# Patient Record
Sex: MALE | Race: WHITE | NOT HISPANIC OR LATINO | Employment: FULL TIME | URBAN - METROPOLITAN AREA
[De-identification: names, ages, dates, MRNs, and addresses within clinical notes are randomized per-mention and may not be internally consistent; named-entity substitution may affect disease eponyms.]

---

## 2017-03-13 PROBLEM — F32.9 MAJOR DEPRESSIVE DISORDER: Chronic | Status: ACTIVE | Noted: 2017-03-13

## 2017-03-13 PROBLEM — E78.00 HYPERCHOLESTEREMIA: Chronic | Status: ACTIVE | Noted: 2017-03-13

## 2017-03-13 PROBLEM — E11.9 TYPE 2 DIABETES MELLITUS: Chronic | Status: ACTIVE | Noted: 2017-03-13

## 2017-03-13 PROBLEM — F51.01 PRIMARY INSOMNIA: Chronic | Status: RESOLVED | Noted: 2017-03-13 | Resolved: 2017-03-13

## 2017-03-13 PROBLEM — T78.40XA ALLERGY: Chronic | Status: ACTIVE | Noted: 2017-03-13

## 2017-03-13 PROBLEM — I25.810 CORONARY ARTERY DISEASE INVOLVING AUTOLOGOUS ARTERY CORONARY BYPASS GRAFT WITHOUT ANGINA PECTORIS: Chronic | Status: ACTIVE | Noted: 2017-03-13

## 2017-03-13 PROBLEM — F51.01 PRIMARY INSOMNIA: Chronic | Status: ACTIVE | Noted: 2017-03-13

## 2017-09-21 PROBLEM — I10 ESSENTIAL HYPERTENSION: Chronic | Status: ACTIVE | Noted: 2017-09-21

## 2019-03-27 PROBLEM — E78.00 HYPERCHOLESTEREMIA: Chronic | Status: RESOLVED | Noted: 2017-03-13 | Resolved: 2019-03-27

## 2019-09-13 PROBLEM — E55.9 VITAMIN D DEFICIENCY: Chronic | Status: ACTIVE | Noted: 2019-09-13

## 2020-09-04 ENCOUNTER — LAB VISIT (OUTPATIENT)
Dept: SURGERY | Facility: CLINIC | Age: 50
End: 2020-09-04
Payer: COMMERCIAL

## 2020-09-04 DIAGNOSIS — R05.9 COUGH: ICD-10-CM

## 2020-09-04 PROCEDURE — U0003 INFECTIOUS AGENT DETECTION BY NUCLEIC ACID (DNA OR RNA); SEVERE ACUTE RESPIRATORY SYNDROME CORONAVIRUS 2 (SARS-COV-2) (CORONAVIRUS DISEASE [COVID-19]), AMPLIFIED PROBE TECHNIQUE, MAKING USE OF HIGH THROUGHPUT TECHNOLOGIES AS DESCRIBED BY CMS-2020-01-R: HCPCS

## 2020-09-05 LAB — SARS-COV-2 RNA RESP QL NAA+PROBE: NOT DETECTED

## 2020-09-08 ENCOUNTER — TELEPHONE (OUTPATIENT)
Dept: SURGERY | Facility: CLINIC | Age: 50
End: 2020-09-08

## 2020-09-08 NOTE — PROGRESS NOTES
Your test was NEGATIVE for COVID-19 (coronavirus).      If your symptoms worsen or if you have any other concerns, please contact [facility] at [phone].     Sincerely,    Alanis Leija NP

## 2020-09-08 NOTE — TELEPHONE ENCOUNTER
----- Message from Alanis Leija NP sent at 9/8/2020  8:26 AM CDT -----  Your test was NEGATIVE for COVID-19 (coronavirus).      If your symptoms worsen or if you have any other concerns, please contact [facility] at [phone].     Sincerely,    Alanis Leija NP

## 2020-09-08 NOTE — TELEPHONE ENCOUNTER
Called patient to inform of Covid 19 screening test results as reported by the laboratory, and review by Alanis Leija NP. Patient informed Covid \-19 screening test results are reported as Covid Not Detected. Patient was given the opportunity to ask any questions about the Covid test results.

## 2020-09-08 NOTE — TELEPHONE ENCOUNTER
Called patient to inform of Covid 19 screening test results as reported by the laboratory, and review by Alanis Leija NP. Patient informed Covid -19 screening test results are reported as Covid Not Detected. Patient was given the opportunity to ask any questions about the Covid test results.

## 2020-10-12 ENCOUNTER — PATIENT MESSAGE (OUTPATIENT)
Dept: ADMINISTRATIVE | Facility: OTHER | Age: 50
End: 2020-10-12

## 2020-11-19 LAB
LEFT EYE DM RETINOPATHY: NEGATIVE
LEFT EYE DM RETINOPATHY: NEGATIVE
RIGHT EYE DM RETINOPATHY: NEGATIVE
RIGHT EYE DM RETINOPATHY: NEGATIVE

## 2021-04-26 ENCOUNTER — PATIENT MESSAGE (OUTPATIENT)
Dept: RESEARCH | Facility: HOSPITAL | Age: 51
End: 2021-04-26

## 2021-09-22 ENCOUNTER — OFFICE VISIT (OUTPATIENT)
Dept: PSYCHIATRY | Facility: CLINIC | Age: 51
End: 2021-09-22
Payer: COMMERCIAL

## 2021-09-22 VITALS
WEIGHT: 164.69 LBS | SYSTOLIC BLOOD PRESSURE: 151 MMHG | BODY MASS INDEX: 25.85 KG/M2 | HEART RATE: 70 BPM | HEIGHT: 67 IN | DIASTOLIC BLOOD PRESSURE: 80 MMHG

## 2021-09-22 DIAGNOSIS — G47.00 INSOMNIA, UNSPECIFIED TYPE: ICD-10-CM

## 2021-09-22 DIAGNOSIS — F41.0 PANIC ATTACKS: ICD-10-CM

## 2021-09-22 DIAGNOSIS — F33.1 MDD (MAJOR DEPRESSIVE DISORDER), RECURRENT EPISODE, MODERATE: ICD-10-CM

## 2021-09-22 DIAGNOSIS — F41.1 GAD (GENERALIZED ANXIETY DISORDER): Primary | ICD-10-CM

## 2021-09-22 PROCEDURE — 99999 PR PBB SHADOW E&M-EST. PATIENT-LVL II: ICD-10-PCS | Mod: PBBFAC,,, | Performed by: STUDENT IN AN ORGANIZED HEALTH CARE EDUCATION/TRAINING PROGRAM

## 2021-09-22 PROCEDURE — 99499 NO LOS: ICD-10-PCS | Mod: S$GLB,,, | Performed by: STUDENT IN AN ORGANIZED HEALTH CARE EDUCATION/TRAINING PROGRAM

## 2021-09-22 PROCEDURE — 99499 UNLISTED E&M SERVICE: CPT | Mod: S$GLB,,, | Performed by: STUDENT IN AN ORGANIZED HEALTH CARE EDUCATION/TRAINING PROGRAM

## 2021-09-22 PROCEDURE — 99999 PR PBB SHADOW E&M-EST. PATIENT-LVL II: CPT | Mod: PBBFAC,,, | Performed by: STUDENT IN AN ORGANIZED HEALTH CARE EDUCATION/TRAINING PROGRAM

## 2021-09-22 RX ORDER — MIRTAZAPINE 15 MG/1
15 TABLET, FILM COATED ORAL NIGHTLY
Qty: 30 TABLET | Refills: 2 | Status: SHIPPED | OUTPATIENT
Start: 2021-09-22 | End: 2021-12-16

## 2021-09-22 RX ORDER — HYDROXYZINE HYDROCHLORIDE 10 MG/1
10 TABLET, FILM COATED ORAL 3 TIMES DAILY PRN
Qty: 90 TABLET | Refills: 2 | Status: SHIPPED | OUTPATIENT
Start: 2021-09-22 | End: 2021-10-22

## 2021-09-22 RX ORDER — SERTRALINE HYDROCHLORIDE 100 MG/1
200 TABLET, FILM COATED ORAL DAILY
Qty: 60 TABLET | Refills: 2 | Status: SHIPPED | OUTPATIENT
Start: 2021-09-22 | End: 2021-10-20 | Stop reason: SDUPTHER

## 2021-10-05 ENCOUNTER — TELEPHONE (OUTPATIENT)
Dept: PSYCHOLOGY | Facility: CLINIC | Age: 51
End: 2021-10-05

## 2021-10-20 ENCOUNTER — OFFICE VISIT (OUTPATIENT)
Dept: PSYCHIATRY | Facility: CLINIC | Age: 51
End: 2021-10-20
Payer: COMMERCIAL

## 2021-10-20 VITALS
HEART RATE: 67 BPM | SYSTOLIC BLOOD PRESSURE: 147 MMHG | DIASTOLIC BLOOD PRESSURE: 73 MMHG | BODY MASS INDEX: 25.38 KG/M2 | WEIGHT: 162.06 LBS

## 2021-10-20 DIAGNOSIS — F41.1 GAD (GENERALIZED ANXIETY DISORDER): ICD-10-CM

## 2021-10-20 DIAGNOSIS — F41.0 PANIC ATTACKS: ICD-10-CM

## 2021-10-20 DIAGNOSIS — F33.1 MDD (MAJOR DEPRESSIVE DISORDER), RECURRENT EPISODE, MODERATE: ICD-10-CM

## 2021-10-20 PROCEDURE — 99214 OFFICE O/P EST MOD 30 MIN: CPT | Mod: S$GLB,,, | Performed by: STUDENT IN AN ORGANIZED HEALTH CARE EDUCATION/TRAINING PROGRAM

## 2021-10-20 PROCEDURE — 99999 PR PBB SHADOW E&M-EST. PATIENT-LVL I: CPT | Mod: PBBFAC,,, | Performed by: STUDENT IN AN ORGANIZED HEALTH CARE EDUCATION/TRAINING PROGRAM

## 2021-10-20 PROCEDURE — 99214 PR OFFICE/OUTPT VISIT, EST, LEVL IV, 30-39 MIN: ICD-10-PCS | Mod: S$GLB,,, | Performed by: STUDENT IN AN ORGANIZED HEALTH CARE EDUCATION/TRAINING PROGRAM

## 2021-10-20 PROCEDURE — 99999 PR PBB SHADOW E&M-EST. PATIENT-LVL I: ICD-10-PCS | Mod: PBBFAC,,, | Performed by: STUDENT IN AN ORGANIZED HEALTH CARE EDUCATION/TRAINING PROGRAM

## 2021-10-20 RX ORDER — CLONAZEPAM 0.5 MG/1
0.5 TABLET ORAL DAILY PRN
Qty: 30 TABLET | Refills: 0 | Status: SHIPPED | OUTPATIENT
Start: 2021-10-20 | End: 2021-11-22 | Stop reason: SDUPTHER

## 2021-10-20 RX ORDER — MIRTAZAPINE 30 MG/1
30 TABLET, FILM COATED ORAL NIGHTLY
Qty: 30 TABLET | Refills: 0 | Status: SHIPPED | OUTPATIENT
Start: 2021-10-20 | End: 2021-12-01 | Stop reason: SDUPTHER

## 2021-10-20 RX ORDER — SERTRALINE HYDROCHLORIDE 100 MG/1
200 TABLET, FILM COATED ORAL DAILY
Qty: 60 TABLET | Refills: 2 | Status: SHIPPED | OUTPATIENT
Start: 2021-10-20 | End: 2021-11-30 | Stop reason: SDUPTHER

## 2021-10-25 ENCOUNTER — OFFICE VISIT (OUTPATIENT)
Dept: ORTHOPEDICS | Facility: CLINIC | Age: 51
End: 2021-10-25
Payer: COMMERCIAL

## 2021-10-25 VITALS
BODY MASS INDEX: 25.99 KG/M2 | HEART RATE: 74 BPM | WEIGHT: 165.56 LBS | HEIGHT: 67 IN | SYSTOLIC BLOOD PRESSURE: 141 MMHG | DIASTOLIC BLOOD PRESSURE: 81 MMHG | RESPIRATION RATE: 16 BRPM

## 2021-10-25 DIAGNOSIS — M25.562 ACUTE PAIN OF LEFT KNEE: Primary | ICD-10-CM

## 2021-10-25 PROCEDURE — 1160F PR REVIEW ALL MEDS BY PRESCRIBER/CLIN PHARMACIST DOCUMENTED: ICD-10-PCS | Mod: CPTII,S$GLB,, | Performed by: ORTHOPAEDIC SURGERY

## 2021-10-25 PROCEDURE — 3077F PR MOST RECENT SYSTOLIC BLOOD PRESSURE >= 140 MM HG: ICD-10-PCS | Mod: CPTII,S$GLB,, | Performed by: ORTHOPAEDIC SURGERY

## 2021-10-25 PROCEDURE — 3008F BODY MASS INDEX DOCD: CPT | Mod: CPTII,S$GLB,, | Performed by: ORTHOPAEDIC SURGERY

## 2021-10-25 PROCEDURE — 3077F SYST BP >= 140 MM HG: CPT | Mod: CPTII,S$GLB,, | Performed by: ORTHOPAEDIC SURGERY

## 2021-10-25 PROCEDURE — 1159F MED LIST DOCD IN RCRD: CPT | Mod: CPTII,S$GLB,, | Performed by: ORTHOPAEDIC SURGERY

## 2021-10-25 PROCEDURE — 3079F DIAST BP 80-89 MM HG: CPT | Mod: CPTII,S$GLB,, | Performed by: ORTHOPAEDIC SURGERY

## 2021-10-25 PROCEDURE — 3044F PR MOST RECENT HEMOGLOBIN A1C LEVEL <7.0%: ICD-10-PCS | Mod: CPTII,S$GLB,, | Performed by: ORTHOPAEDIC SURGERY

## 2021-10-25 PROCEDURE — 99999 PR PBB SHADOW E&M-EST. PATIENT-LVL V: CPT | Mod: PBBFAC,,, | Performed by: ORTHOPAEDIC SURGERY

## 2021-10-25 PROCEDURE — 99999 PR PBB SHADOW E&M-EST. PATIENT-LVL V: ICD-10-PCS | Mod: PBBFAC,,, | Performed by: ORTHOPAEDIC SURGERY

## 2021-10-25 PROCEDURE — 3044F HG A1C LEVEL LT 7.0%: CPT | Mod: CPTII,S$GLB,, | Performed by: ORTHOPAEDIC SURGERY

## 2021-10-25 PROCEDURE — 99204 PR OFFICE/OUTPT VISIT, NEW, LEVL IV, 45-59 MIN: ICD-10-PCS | Mod: S$GLB,,, | Performed by: ORTHOPAEDIC SURGERY

## 2021-10-25 PROCEDURE — 1160F RVW MEDS BY RX/DR IN RCRD: CPT | Mod: CPTII,S$GLB,, | Performed by: ORTHOPAEDIC SURGERY

## 2021-10-25 PROCEDURE — 3008F PR BODY MASS INDEX (BMI) DOCUMENTED: ICD-10-PCS | Mod: CPTII,S$GLB,, | Performed by: ORTHOPAEDIC SURGERY

## 2021-10-25 PROCEDURE — 3079F PR MOST RECENT DIASTOLIC BLOOD PRESSURE 80-89 MM HG: ICD-10-PCS | Mod: CPTII,S$GLB,, | Performed by: ORTHOPAEDIC SURGERY

## 2021-10-25 PROCEDURE — 99204 OFFICE O/P NEW MOD 45 MIN: CPT | Mod: S$GLB,,, | Performed by: ORTHOPAEDIC SURGERY

## 2021-10-25 PROCEDURE — 1159F PR MEDICATION LIST DOCUMENTED IN MEDICAL RECORD: ICD-10-PCS | Mod: CPTII,S$GLB,, | Performed by: ORTHOPAEDIC SURGERY

## 2021-11-02 ENCOUNTER — TELEPHONE (OUTPATIENT)
Dept: PSYCHOLOGY | Facility: CLINIC | Age: 51
End: 2021-11-02
Payer: COMMERCIAL

## 2021-12-01 ENCOUNTER — OFFICE VISIT (OUTPATIENT)
Dept: PSYCHIATRY | Facility: CLINIC | Age: 51
End: 2021-12-01
Payer: COMMERCIAL

## 2021-12-01 VITALS
DIASTOLIC BLOOD PRESSURE: 72 MMHG | SYSTOLIC BLOOD PRESSURE: 138 MMHG | BODY MASS INDEX: 25.34 KG/M2 | HEART RATE: 78 BPM | WEIGHT: 161.81 LBS

## 2021-12-01 DIAGNOSIS — F41.1 GAD (GENERALIZED ANXIETY DISORDER): Primary | ICD-10-CM

## 2021-12-01 DIAGNOSIS — F33.1 MDD (MAJOR DEPRESSIVE DISORDER), RECURRENT EPISODE, MODERATE: ICD-10-CM

## 2021-12-01 DIAGNOSIS — F41.0 PANIC ATTACKS: ICD-10-CM

## 2021-12-01 PROCEDURE — 99999 PR PBB SHADOW E&M-EST. PATIENT-LVL I: CPT | Mod: PBBFAC,,, | Performed by: STUDENT IN AN ORGANIZED HEALTH CARE EDUCATION/TRAINING PROGRAM

## 2021-12-01 PROCEDURE — 99214 PR OFFICE/OUTPT VISIT, EST, LEVL IV, 30-39 MIN: ICD-10-PCS | Mod: S$GLB,,, | Performed by: STUDENT IN AN ORGANIZED HEALTH CARE EDUCATION/TRAINING PROGRAM

## 2021-12-01 PROCEDURE — 99999 PR PBB SHADOW E&M-EST. PATIENT-LVL I: ICD-10-PCS | Mod: PBBFAC,,, | Performed by: STUDENT IN AN ORGANIZED HEALTH CARE EDUCATION/TRAINING PROGRAM

## 2021-12-01 PROCEDURE — 99214 OFFICE O/P EST MOD 30 MIN: CPT | Mod: S$GLB,,, | Performed by: STUDENT IN AN ORGANIZED HEALTH CARE EDUCATION/TRAINING PROGRAM

## 2021-12-01 RX ORDER — CLONAZEPAM 0.5 MG/1
0.5 TABLET ORAL 2 TIMES DAILY
Qty: 30 TABLET | Refills: 0 | Status: SHIPPED | OUTPATIENT
Start: 2021-12-01 | End: 2021-12-15 | Stop reason: SDUPTHER

## 2021-12-01 RX ORDER — SERTRALINE HYDROCHLORIDE 100 MG/1
200 TABLET, FILM COATED ORAL DAILY
Qty: 60 TABLET | Refills: 3 | Status: SHIPPED | OUTPATIENT
Start: 2021-12-01 | End: 2022-01-05 | Stop reason: SDUPTHER

## 2021-12-01 RX ORDER — MIRTAZAPINE 30 MG/1
30 TABLET, FILM COATED ORAL NIGHTLY
Qty: 30 TABLET | Refills: 3 | Status: SHIPPED | OUTPATIENT
Start: 2021-12-01 | End: 2021-12-16

## 2021-12-01 RX ORDER — BUSPIRONE HYDROCHLORIDE 30 MG/1
30 TABLET ORAL 2 TIMES DAILY
Qty: 60 TABLET | Refills: 3 | Status: SHIPPED | OUTPATIENT
Start: 2021-12-01 | End: 2021-12-16 | Stop reason: SDUPTHER

## 2021-12-01 RX ORDER — HYDROXYZINE HYDROCHLORIDE 25 MG/1
25 TABLET, FILM COATED ORAL 2 TIMES DAILY PRN
Qty: 60 TABLET | Refills: 3 | Status: SHIPPED | OUTPATIENT
Start: 2021-12-01 | End: 2022-03-11

## 2022-01-05 ENCOUNTER — OFFICE VISIT (OUTPATIENT)
Dept: PSYCHIATRY | Facility: CLINIC | Age: 52
End: 2022-01-05
Payer: COMMERCIAL

## 2022-01-05 DIAGNOSIS — F41.1 GAD (GENERALIZED ANXIETY DISORDER): ICD-10-CM

## 2022-01-05 DIAGNOSIS — F41.0 PANIC ATTACKS: ICD-10-CM

## 2022-01-05 DIAGNOSIS — F33.1 MDD (MAJOR DEPRESSIVE DISORDER), RECURRENT EPISODE, MODERATE: ICD-10-CM

## 2022-01-05 DIAGNOSIS — G47.00 INSOMNIA, UNSPECIFIED TYPE: Primary | ICD-10-CM

## 2022-01-05 PROCEDURE — 99213 PR OFFICE/OUTPT VISIT, EST, LEVL III, 20-29 MIN: ICD-10-PCS | Mod: 95,,, | Performed by: STUDENT IN AN ORGANIZED HEALTH CARE EDUCATION/TRAINING PROGRAM

## 2022-01-05 PROCEDURE — 99213 OFFICE O/P EST LOW 20 MIN: CPT | Mod: 95,,, | Performed by: STUDENT IN AN ORGANIZED HEALTH CARE EDUCATION/TRAINING PROGRAM

## 2022-01-05 RX ORDER — BUSPIRONE HYDROCHLORIDE 30 MG/1
30 TABLET ORAL 2 TIMES DAILY
Qty: 60 TABLET | Refills: 2 | Status: SHIPPED | OUTPATIENT
Start: 2022-01-05 | End: 2022-02-02 | Stop reason: SDUPTHER

## 2022-01-05 RX ORDER — CLONAZEPAM 0.5 MG/1
0.5 TABLET ORAL 2 TIMES DAILY PRN
Qty: 60 TABLET | Refills: 0 | Status: SHIPPED | OUTPATIENT
Start: 2022-01-05 | End: 2022-02-02 | Stop reason: SDUPTHER

## 2022-01-05 RX ORDER — SERTRALINE HYDROCHLORIDE 100 MG/1
200 TABLET, FILM COATED ORAL DAILY
Qty: 60 TABLET | Refills: 3 | Status: SHIPPED | OUTPATIENT
Start: 2022-01-05 | End: 2022-02-02 | Stop reason: SDUPTHER

## 2022-01-05 RX ORDER — MIRTAZAPINE 30 MG/1
30 TABLET, FILM COATED ORAL NIGHTLY
Qty: 30 TABLET | Refills: 11 | Status: SHIPPED | OUTPATIENT
Start: 2022-01-05 | End: 2022-02-02 | Stop reason: SDUPTHER

## 2022-01-05 NOTE — PROGRESS NOTES
OUTPATIENT PSYCHIATRY FOLLOW UP VISIT     ENCOUNTER DATE:  1/5/22  SITE:  [facility], [facility]  LENGTH OF SESSION:  15 minutes    TELE PSYCHIATRY Disclaimer   *The patient was informed despite using HIPPA compliant technology there may be risks including security breach, technological failure, inability to perform a comprehensive physical exam which could delay or prevent an accurate diagnosis, and potential complications from treatment decisions rendered over a telemedical platform. The patient understands and consented to the use of tele-health service as being a safe measure to mitigate during COVID 19 Pandemic .  The patient was also informed of the relationship between the physician and patient and the respective role of any other health care provider with respect to management of the patient; and notified that the pt may decline to receive medical services by telemedicine and may withdraw from such care at any time.     Patient's Current location:     [address] [place] [address]  Visit type: Virtual visit with synchronous audio and video via [employer]  Total time spent with patient: 15 minutes         CHIEF COMPLAINT:  Anxiety, depression     HISTORY OF PRESENTING ILLNESS:  Joel Zheng is a 51 y.o. male with history of anxiety, depression who presents for follow up appointment.       Plan at last appointment on  12/1/2021:  · Continue Zoloft 200mg qd.       · Continue Remeron 30mg qhs for insomnia, anxiety.    · Continue Atarax 10mg TID prn   · Increase Klonppin to 0.5mg BID.   Discussed risks including addictive potential.  30 day supply provided. Discussed temporary measure to help bridge him though this difficult time.   · Start Buspar 30mg BID for depression, anxiety augmentation.  Currently tolerating 15mg BID without s/e.     · Pt educated on risks including alcohol, addictive potential, not to operate vehicles or heavy machinery.      · Continue with outside  counselor/therapist whom he is seeing twice weekly with notable benefit.        Interval history as told by Patient - & or family/friend/spouse/caregiver with pts permission     In better spirits today. Holidays were rough given first time without wife.  Divorce ongoing but not final.  He spent time with family and brothers which helped.  No additional setbacks or stressors. Feels like medications have made a big difference.  Compliant with zoloft, remeron, buspar.  No se.  Taking 1 klonopin in the morning, 1/2-1 tablet after lunch.  Feels like it has been helpful preventing anxiety/panic attacks.    No si/hi/avh.  Pt appears objectively better today.  Euthymic, not perseverating on divorce.  Pt reports that he has only been taking half tablets of buspar twice a day (15mg BID).  Instructed to get to therapeutic dose of 30mg BID and will evaluate at next appt if this medication is helpful.               PSYCHIATRIC REVIEW OF SYSTEMS:(none/ yes- better/worse/stable/& what symptoms)     Symptoms of Depression: Improving.   diminished mood, anhedonia, diminished energy, insomnia, decreased appetite, diminished concentration, PMR, hopelessness, Onset 2 months ago.        Symptoms of PUJA:  Improving.  anxiety/worry/fear, more days than not, about numerous issues, difficult to control, with restlessness, fatigue, poor concentration, irritability, muscle tension, sleep disturbance; causes functionally impairing distress   Onset was approximately 2 month ago. Symptoms have been gradually worsening since that time.     Symptoms of Panic Attacks: Improving.  recurrent panic attacks, improving in frequency.  Triggered by contact with  wife.  Description- as above     Symptoms of jose carlos or hypomania: none     Symptoms of psychosis:  none     Symptoms of PTSD:none     Sleep: initiation,  maintenance     Other Psych ROS: n/a     Symptoms of OCD: none     Symptoms of Eating Disorders: none     Symptoms of ADHD:   none     Risk Parameters:  Patient reports no suicidal ideation  Patient reports no homicidal ideation  Patient reports no self-injurious behavior  Patient reports no violent behavior     PSYCHIATRIC MED REVIEW     Current psych meds  Medication side effects:  denies  Medication compliance:   yes     Previous psych meds trials  Zoloft  buspar (makes him sleepy, Didn't work)  Paxil  remeron  Hydroxyzine  klonopin     PAST PSYCHIATRIC, MEDICAL, AND SOCIAL HISTORY REVIEWED  The patient's past medical, family and social history have been reviewed and updated as appropriate within the electronic medical record - see encounter notes.     MEDICAL REVIEW OF SYSTEMS:  Complete review of systems performed covering Constitutional, Musculoskeletal, Neurologic.  All systems negative/ except as mentioned in HPI.       ALL MEDICATIONS:     Current Outpatient Medications:     amoxicillin-clavulanate 500-125mg (AUGMENTIN) 500-125 mg Tab, Take 1 tablet (500 mg total) by mouth 2 (two) times daily., Disp: 10 tablet, Rfl: 0    aspirin 81 MG Chew, Take 81 mg by mouth once daily., Disp: , Rfl:     atorvastatin (LIPITOR) 80 MG tablet, Take 0.5 tablets (40 mg total) by mouth once daily., Disp: 90 tablet, Rfl: 0    blood sugar diagnostic Strp, 1 each by Misc.(Non-Drug; Combo Route) route 3 (three) times daily before meals., Disp: 100 each, Rfl: 2    cetirizine (ZYRTEC) 10 MG tablet, Take 1 tablet (10 mg total) by mouth once daily., Disp: 60 tablet, Rfl: 0    clonazePAM (KLONOPIN) 0.5 MG tablet, Take 1 tablet (0.5 mg total) by mouth daily as needed for Anxiety (panic attacks)., Disp: 30 tablet, Rfl: 0    ergocalciferol (VITAMIN D2) 50,000 unit Cap, Take 1 capsule (50,000 Units total) by mouth every 7 days., Disp: 12 capsule, Rfl: 0    fenofibrate (TRICOR) 54 MG tablet, Take 1 tablet (54 mg total) by mouth once daily., Disp: 90 tablet, Rfl: 0    fluticasone propionate (FLONASE) 50 mcg/actuation nasal spray, 1 spray (50 mcg total) by  Each Nostril route once daily., Disp: 16 g, Rfl: 0    HYDROcodone-acetaminophen (NORCO) 5-325 mg per tablet, Take 1 tablet by mouth every 4 (four) hours as needed for Pain., Disp: 10 tablet, Rfl: 0    metFORMIN (GLUCOPHAGE) 500 MG tablet, Take 1 tablet (500 mg total) by mouth 2 (two) times daily with meals., Disp: 180 tablet, Rfl: 0    metoprolol succinate (TOPROL-XL) 50 MG 24 hr tablet, Take 1 tablet (50 mg total) by mouth once daily., Disp: 90 tablet, Rfl: 0    mirtazapine (REMERON) 15 MG tablet, Take 1 tablet (15 mg total) by mouth every evening., Disp: 30 tablet, Rfl: 2    mirtazapine (REMERON) 30 MG tablet, Take 1 tablet (30 mg total) by mouth every evening., Disp: 30 tablet, Rfl: 0    multivitamin (THERAGRAN) per tablet, Take 1 tablet by mouth once daily., Disp: , Rfl:     semaglutide (RYBELSUS) 3 mg tablet, Take 1 tablet (3 mg total) by mouth once daily., Disp: 90 tablet, Rfl: 0    sertraline (ZOLOFT) 100 MG tablet, Take 2 tablets (200 mg total) by mouth once daily., Disp: 60 tablet, Rfl: 0    traMADoL (ULTRAM) 50 mg tablet, Take 1 tablet (50 mg total) by mouth every 12 (twelve) hours as needed for Pain., Disp: 20 tablet, Rfl: 0    traZODone (DESYREL) 50 MG tablet, Take 1 tablet (50 mg total) by mouth nightly., Disp: 30 tablet, Rfl: 0     ALLERGIES:       Review of patient's allergies indicates:   Allergen Reactions    Bactrim [sulfamethoxazole-trimethoprim] Itching    Black pepper Itching    Grass pollen-june grass standard Itching    Pineapple fruit extract Itching         RELEVANT LABS/STUDIES:          Lab Results   Component Value Date     WBC 7.60 09/17/2021     HGB 13.9 (L) 09/17/2021     HCT 40.1 09/17/2021     MCV 89 09/17/2021      09/17/2021      BMP        Lab Results   Component Value Date      09/17/2021     K 4.0 09/17/2021      09/17/2021     CO2 25 09/17/2021     BUN 18 09/17/2021     CREATININE 0.8 09/17/2021     CALCIUM 9.8 09/17/2021     ANIONGAP 9  09/17/2021     ESTGFRAFRICA >60.0 09/17/2021     EGFRNONAA >60.0 09/17/2021            Lab Results   Component Value Date     ALT 45 (H) 09/17/2021     AST 24 09/17/2021     ALKPHOS 55 09/17/2021     BILITOT 0.5 09/17/2021            Lab Results   Component Value Date     TSH 2.727 09/17/2021            Lab Results   Component Value Date     LABA1C 6.8 (H) 09/13/2017     HGBA1C 6.8 (H) 09/17/2021         VITALS      Vitals:     12/01/21 1358   BP: 138/72   Pulse: 78   Weight: 73.4 kg (161 lb 13.1 oz)         PHYSICAL EXAM  General: well developed, well nourished  Neurologic:   Gait: unable to assess (telehealth)   Psychomotor signs:  unable to assess (telehealth)   AIMS: unable to assess (telehealth)      PSYCHIATRIC EXAM:     Mental Status Exam:  Appearance: unremarkable, age appropriate  Behavior/Cooperation: psychomotor retardation  Speech: normal volume, rate/tone  Language: uses words appropriately; NO aphasia or dysarthria  Mood: euthymic  Affect: congruent  Thought Process: normal and logical  Thought Content: normal, no suicidality, no homicidality, delusions, or paranoia  Level of Consciousness: Alert and Oriented x3  Memory:  Intact  Attention/concentration: appropriate for age/education.   Fund of Knowledge: appears adequate  Insight:  Intact  Judgment: Intact         IMPRESSION:    Joel Zheng is a 51 y.o. male with history of anxiety, depression, panic attacks who presents for follow up appointment for MM.     Pt with hx of depression, anxiety in setting of psychosocial stressors--ongoing divorce--pt unable to come to terms or reconcile this life changing events.  Pt with worsening symptoms as proceedings ongoing.  Some improvement thus far but MM ongoing as documented below.  1/5/22: Pt improved on current med regiment.  Will evaluate utility of buspar at next appt.       Status/Progress:  Based on the examination today, the patient's problem(s) is/are inadequately controlled.  New problems have  not been presented today.     DIAGNOSES:  PUJA with panic attacks  MDD recurrent, moderate without psychotic features  Insomnia     PLAN:  Psych Med:  · Continue Zoloft 200mg qd     · Continue Remeron 30mg qhs for insomnia, anxiety.    · Continue Klonppin to 0.5mg BID.   Discussed risks including addictive potential.  30 day supply provided. Discussed temporary measure to help bridge him though this difficult time.   · Continue Buspar 30mg BID for depression, anxiety augmentation.  Pt has tolerated lower doses in the past without efficacy.    · Pt educated on risks including alcohol, addictive potential, not to operate vehicles or heavy machinery.      · Continue with outside counselor/therapist whom he is seeing twice weekly with notable benefit.        · Discussed with patient informed consent, risks vs. benefits, alternative treatments, side effect profile and the inherent unpredictability of individual responses to these treatments. Answered any questions patient may have had. The patient expresses understanding of the above and displays the capacity to agree with this current plan         RETURN TO CLINIC:   1 month     Jose Borges MD  [facility] Psychiat[facility] PGY-3

## 2022-01-26 NOTE — PROGRESS NOTES
STAFF COMMENTS: I have discussed pt with Dr. Borges and reviewed the history and exam. I agree and concur with the assessment and plan.

## 2022-02-02 ENCOUNTER — OFFICE VISIT (OUTPATIENT)
Dept: PSYCHIATRY | Facility: CLINIC | Age: 52
End: 2022-02-02
Payer: COMMERCIAL

## 2022-02-02 VITALS
DIASTOLIC BLOOD PRESSURE: 67 MMHG | WEIGHT: 165 LBS | SYSTOLIC BLOOD PRESSURE: 147 MMHG | HEART RATE: 68 BPM | BODY MASS INDEX: 25.84 KG/M2

## 2022-02-02 DIAGNOSIS — F41.0 PANIC ATTACKS: ICD-10-CM

## 2022-02-02 DIAGNOSIS — F41.1 GAD (GENERALIZED ANXIETY DISORDER): ICD-10-CM

## 2022-02-02 DIAGNOSIS — G47.00 INSOMNIA, UNSPECIFIED TYPE: ICD-10-CM

## 2022-02-02 DIAGNOSIS — F33.1 MDD (MAJOR DEPRESSIVE DISORDER), RECURRENT EPISODE, MODERATE: ICD-10-CM

## 2022-02-02 PROCEDURE — 99999 PR PBB SHADOW E&M-EST. PATIENT-LVL II: CPT | Mod: PBBFAC,,, | Performed by: STUDENT IN AN ORGANIZED HEALTH CARE EDUCATION/TRAINING PROGRAM

## 2022-02-02 PROCEDURE — 99214 OFFICE O/P EST MOD 30 MIN: CPT | Mod: S$GLB,,, | Performed by: STUDENT IN AN ORGANIZED HEALTH CARE EDUCATION/TRAINING PROGRAM

## 2022-02-02 PROCEDURE — 99999 PR PBB SHADOW E&M-EST. PATIENT-LVL II: ICD-10-PCS | Mod: PBBFAC,,, | Performed by: STUDENT IN AN ORGANIZED HEALTH CARE EDUCATION/TRAINING PROGRAM

## 2022-02-02 PROCEDURE — 99214 PR OFFICE/OUTPT VISIT, EST, LEVL IV, 30-39 MIN: ICD-10-PCS | Mod: S$GLB,,, | Performed by: STUDENT IN AN ORGANIZED HEALTH CARE EDUCATION/TRAINING PROGRAM

## 2022-02-02 RX ORDER — BUSPIRONE HYDROCHLORIDE 30 MG/1
30 TABLET ORAL 2 TIMES DAILY
Qty: 180 TABLET | Refills: 3 | Status: SHIPPED | OUTPATIENT
Start: 2022-02-02 | End: 2022-05-04

## 2022-02-02 RX ORDER — CLONAZEPAM 0.5 MG/1
0.5 TABLET ORAL 2 TIMES DAILY PRN
Qty: 60 TABLET | Refills: 0 | Status: SHIPPED | OUTPATIENT
Start: 2022-03-30 | End: 2022-03-11

## 2022-02-02 RX ORDER — CLONAZEPAM 0.5 MG/1
0.5 TABLET ORAL 2 TIMES DAILY PRN
Qty: 60 TABLET | Refills: 0 | Status: SHIPPED | OUTPATIENT
Start: 2022-02-02 | End: 2022-03-11

## 2022-02-02 RX ORDER — MIRTAZAPINE 30 MG/1
30 TABLET, FILM COATED ORAL NIGHTLY
Qty: 90 TABLET | Refills: 3 | Status: SHIPPED | OUTPATIENT
Start: 2022-02-02 | End: 2022-03-11 | Stop reason: SDUPTHER

## 2022-02-02 RX ORDER — SERTRALINE HYDROCHLORIDE 100 MG/1
200 TABLET, FILM COATED ORAL DAILY
Qty: 180 TABLET | Refills: 3 | Status: SHIPPED | OUTPATIENT
Start: 2022-02-02 | End: 2022-03-11 | Stop reason: SDUPTHER

## 2022-02-02 RX ORDER — CLONAZEPAM 0.5 MG/1
0.5 TABLET ORAL 2 TIMES DAILY PRN
Qty: 60 TABLET | Refills: 0 | Status: SHIPPED | OUTPATIENT
Start: 2022-03-02 | End: 2022-03-28 | Stop reason: SDUPTHER

## 2022-02-02 NOTE — PROGRESS NOTES
"OUTPATIENT PSYCHIATRY FOLLOW UP VISIT    ENCOUNTER DATE:  2/2/2022  SITE:  [facility], [facility]  LENGTH OF SESSION:  30 minutes      CHIEF COMPLAINT:  Anxiety, depression    HISTORY OF PRESENTING ILLNESS:  Joel Zheng is a 51 y.o. male with history of PUJA with panic attacks, MDD recurrent, moderate without psychotic features, Insomnia who presents for follow up appointment.      Plan at last appointment on 1/5/2022:  · Continue Zoloft 200mg qd     · Continue Remeron 30mg qhs for insomnia, anxiety.    · Continue Klonppin to 0.5mg BID.   Discussed risks including addictive potential.  30 day supply provided. Discussed temporary measure to help bridge him though this difficult time.   · Continue Buspar 30mg BID for depression, anxiety augmentation.  Pt has tolerated lower doses in the past without efficacy.    · Pt educated on risks including alcohol, addictive potential, not to operate vehicles or heavy machinery.      · Continue with outside counselor/therapist whom he is seeing twice weekly with notable benefit.         Interval history as told by Patient - & or family/friend/spouse/caregiver with pts permission      Holidays went well, family has been supportive. "Been different without my wife."  Still grieving separation from wife.  Ongoing disputes financially and legally.  Has been a major source of stress (refinancing, selling) contributing to ongoing anxiety.  "Its really hard to accept after 30 years."   Other stressor is son who just got out of rehab.      Medication compliant.  Taking klonopin and buspar in the morning.  After lunch taking 1/2 klonopin. At night taking Remeron and Zoloft.  No side effects, good efficacy.  Has only been taking buspar in the morning only, not evening because felt like it was giving him trouble sleeping. He plan to try taking the second dose earlier in the day.         PSYCHIATRIC REVIEW OF SYSTEMS:(none/ yes- better/worse/stable/& what " symptoms)     Symptoms of Depression: Improving.   diminished mood, anhedonia, diminished energy, insomnia, decreased appetite, diminished concentration, PMR, hopelessness, Onset 2 months ago.        Symptoms of PUJA:   Overall improving but anxiety still present.  On initial visit: Anxiety/worry/fear, more days than not, about numerous issues, difficult to control, with restlessness, fatigue, poor concentration, irritability, muscle tension, sleep disturbance; causes functionally impairing distress.  Onset was approximately 2 month ago. Symptoms have been gradually worsening since that time.     Symptoms of Panic Attacks: Improving.  recurrent panic attacks, improving in frequency.  Triggered by contact with  wife.  Description- as above     Symptoms of jose carlos or hypomania: none     Symptoms of psychosis:  none     Symptoms of PTSD:none     Sleep: initiation,  maintenance     Other Psych ROS: n/a     Symptoms of OCD: none     Symptoms of Eating Disorders: none     Symptoms of ADHD:  none     Risk Parameters:  Patient reports no suicidal ideation  Patient reports no homicidal ideation  Patient reports no self-injurious behavior  Patient reports no violent behavior     PSYCHIATRIC MED REVIEW     Current psych meds  Medication side effects:  denies  Medication compliance:   yes     Previous psych meds trials  Zoloft  buspar (makes him sleepy, Didn't work)  Paxil  remeron  Hydroxyzine  klonopin     PAST PSYCHIATRIC, MEDICAL, AND SOCIAL HISTORY REVIEWED  The patient's past medical, family and social history have been reviewed and updated as appropriate within the electronic medical record - see encounter notes.     MEDICAL REVIEW OF SYSTEMS:  Complete review of systems performed covering Constitutional, Musculoskeletal, Neurologic.  All systems negative/ except as mentioned in HPI.        ALL MEDICATIONS:    Current Outpatient Medications:     amoxicillin-clavulanate 500-125mg (AUGMENTIN) 500-125 mg Tab, Take 1  tablet (500 mg total) by mouth 2 (two) times daily., Disp: 10 tablet, Rfl: 0    aspirin 81 MG Chew, Take 81 mg by mouth once daily., Disp: , Rfl:     atorvastatin (LIPITOR) 80 MG tablet, Take 0.5 tablets (40 mg total) by mouth once daily., Disp: 90 tablet, Rfl: 0    blood sugar diagnostic Strp, 1 each by Misc.(Non-Drug; Combo Route) route 3 (three) times daily before meals., Disp: 100 each, Rfl: 2    busPIRone (BUSPAR) 30 MG Tab, Take 1 tablet (30 mg total) by mouth 2 (two) times daily., Disp: 60 tablet, Rfl: 2    cetirizine (ZYRTEC) 10 MG tablet, Take 1 tablet (10 mg total) by mouth once daily., Disp: 60 tablet, Rfl: 0    clonazePAM (KLONOPIN) 0.5 MG tablet, Take 1 tablet (0.5 mg total) by mouth 2 (two) times daily as needed for Anxiety (panic attacks)., Disp: 60 tablet, Rfl: 0    ergocalciferol (VITAMIN D2) 50,000 unit Cap, Take 1 capsule (50,000 Units total) by mouth every 7 days., Disp: 12 capsule, Rfl: 0    fenofibrate (TRICOR) 54 MG tablet, Take 1 tablet (54 mg total) by mouth once daily., Disp: 90 tablet, Rfl: 0    hydrOXYzine HCL (ATARAX) 25 MG tablet, Take 1 tablet (25 mg total) by mouth 2 (two) times daily as needed for Anxiety., Disp: 60 tablet, Rfl: 3    metFORMIN (GLUCOPHAGE) 500 MG tablet, Take 1 tablet (500 mg total) by mouth 2 (two) times daily with meals., Disp: 180 tablet, Rfl: 0    metoprolol succinate (TOPROL-XL) 50 MG 24 hr tablet, Take 1 tablet (50 mg total) by mouth once daily., Disp: 90 tablet, Rfl: 0    mirtazapine (REMERON) 30 MG tablet, Take 1 tablet (30 mg total) by mouth every evening., Disp: 30 tablet, Rfl: 11    multivitamin (THERAGRAN) per tablet, Take 1 tablet by mouth once daily., Disp: , Rfl:     sertraline (ZOLOFT) 100 MG tablet, Take 2 tablets (200 mg total) by mouth once daily., Disp: 60 tablet, Rfl: 3    traMADoL (ULTRAM) 50 mg tablet, Take 1 tablet (50 mg total) by mouth every 12 (twelve) hours as needed for Pain., Disp: 20 tablet, Rfl: 0    traZODone (DESYREL)  50 MG tablet, Take 1 tablet (50 mg total) by mouth nightly., Disp: 30 tablet, Rfl: 0    ALLERGIES:  Review of patient's allergies indicates:   Allergen Reactions    Bactrim [sulfamethoxazole-trimethoprim] Itching    Black pepper Itching    Grass pollen-june grass standard Itching    Pineapple fruit extract Itching       RELEVANT LABS/STUDIES:    Lab Results   Component Value Date    WBC 7.60 09/17/2021    HGB 13.9 (L) 09/17/2021    HCT 40.1 09/17/2021    MCV 89 09/17/2021     09/17/2021     BMP  Lab Results   Component Value Date     09/17/2021    K 4.0 09/17/2021     09/17/2021    CO2 25 09/17/2021    BUN 18 09/17/2021    CREATININE 0.8 09/17/2021    CALCIUM 9.8 09/17/2021    ANIONGAP 9 09/17/2021    ESTGFRAFRICA >60.0 09/17/2021    EGFRNONAA >60.0 09/17/2021     Lab Results   Component Value Date    ALT 45 (H) 09/17/2021    AST 24 09/17/2021    ALKPHOS 55 09/17/2021    BILITOT 0.5 09/17/2021     Lab Results   Component Value Date    TSH 2.727 09/17/2021     Lab Results   Component Value Date    LABA1C 6.8 (H) 09/13/2017    HGBA1C 6.8 (H) 09/17/2021       VITALS  There were no vitals filed for this visit.    PHYSICAL EXAM  PHYSICAL EXAM  General: well developed, well nourished  Neurologic:   Gait: unable to assess (telehealth)   Psychomotor signs:  unable to assess (telehealth)   AIMS: unable to assess (telehealth)      PSYCHIATRIC EXAM:     Mental Status Exam:  Appearance: unremarkable, age appropriate  Behavior/Cooperation: psychomotor retardation  Speech: normal volume, rate/tone  Language: uses words appropriately; NO aphasia or dysarthria  Mood: euthymic  Affect: congruent  Thought Process: normal and logical  Thought Content: normal, no suicidality, no homicidality, delusions, or paranoia  Level of Consciousness: Alert and Oriented x3  Memory:  Intact  Attention/concentration: appropriate for age/education.   Fund of Knowledge: appears adequate  Insight:  Intact  Judgment: Intact        IMPRESSION:    Joel Zheng is a 51 y.o. male with history of PUJA with panic attacks, MDD recurrent, moderate without psychotic features, Insomnia who presents for follow up appointment.    Status/Progress:  Based on the examination today, the patient's problem(s) is/are well controlled.  New problems have not been presented today.     DIAGNOSES:    PUJA with panic attacks  MDD recurrent, moderate without psychotic features  Insomnia    PLAN:  Psych Med:  · Continue Zoloft 200mg qd     · Continue Remeron 30mg qhs for insomnia, anxiety.    · Continue Klonppin to 0.5mg BID.   Discussed risks including addictive potential.  30 day supply provided. Discussed temporary measure to help bridge him though this difficult time.   · Continue Buspar 30mg BID for depression, anxiety augmentation.  Pt has tolerated lower doses in the past without efficacy.    · Pt educated on risks including alcohol, addictive potential, not to operate vehicles or heavy machinery.      · Continue with outside counselor/therapist whom he is seeing every 2 weeks in person with notable benefit.        · Discussed with patient informed consent, risks vs. benefits, alternative treatments, side effect profile and the inherent unpredictability of individual responses to these treatments. Answered any questions patient may have had. The patient expresses understanding of the above and displays the capacity to agree with this current plan         RETURN TO CLINIC:   3 month        Jose Borges MD  [facility] PGY-3

## 2022-03-30 ENCOUNTER — OFFICE VISIT (OUTPATIENT)
Dept: UROLOGY | Facility: CLINIC | Age: 52
End: 2022-03-30
Payer: COMMERCIAL

## 2022-03-30 VITALS
WEIGHT: 162.25 LBS | HEIGHT: 67 IN | HEART RATE: 70 BPM | BODY MASS INDEX: 25.47 KG/M2 | DIASTOLIC BLOOD PRESSURE: 70 MMHG | SYSTOLIC BLOOD PRESSURE: 126 MMHG

## 2022-03-30 DIAGNOSIS — N53.14 RETROGRADE EJACULATION: ICD-10-CM

## 2022-03-30 DIAGNOSIS — R35.1 NOCTURIA MORE THAN TWICE PER NIGHT: Primary | ICD-10-CM

## 2022-03-30 PROCEDURE — 1159F MED LIST DOCD IN RCRD: CPT | Mod: CPTII,S$GLB,, | Performed by: NURSE PRACTITIONER

## 2022-03-30 PROCEDURE — 99999 PR PBB SHADOW E&M-EST. PATIENT-LVL V: ICD-10-PCS | Mod: PBBFAC,,, | Performed by: NURSE PRACTITIONER

## 2022-03-30 PROCEDURE — 1159F PR MEDICATION LIST DOCUMENTED IN MEDICAL RECORD: ICD-10-PCS | Mod: CPTII,S$GLB,, | Performed by: NURSE PRACTITIONER

## 2022-03-30 PROCEDURE — 3078F DIAST BP <80 MM HG: CPT | Mod: CPTII,S$GLB,, | Performed by: NURSE PRACTITIONER

## 2022-03-30 PROCEDURE — 1160F RVW MEDS BY RX/DR IN RCRD: CPT | Mod: CPTII,S$GLB,, | Performed by: NURSE PRACTITIONER

## 2022-03-30 PROCEDURE — 3008F PR BODY MASS INDEX (BMI) DOCUMENTED: ICD-10-PCS | Mod: CPTII,S$GLB,, | Performed by: NURSE PRACTITIONER

## 2022-03-30 PROCEDURE — 3008F BODY MASS INDEX DOCD: CPT | Mod: CPTII,S$GLB,, | Performed by: NURSE PRACTITIONER

## 2022-03-30 PROCEDURE — 1160F PR REVIEW ALL MEDS BY PRESCRIBER/CLIN PHARMACIST DOCUMENTED: ICD-10-PCS | Mod: CPTII,S$GLB,, | Performed by: NURSE PRACTITIONER

## 2022-03-30 PROCEDURE — 99203 OFFICE O/P NEW LOW 30 MIN: CPT | Mod: S$GLB,,, | Performed by: NURSE PRACTITIONER

## 2022-03-30 PROCEDURE — 99999 PR PBB SHADOW E&M-EST. PATIENT-LVL V: CPT | Mod: PBBFAC,,, | Performed by: NURSE PRACTITIONER

## 2022-03-30 PROCEDURE — 99203 PR OFFICE/OUTPT VISIT, NEW, LEVL III, 30-44 MIN: ICD-10-PCS | Mod: S$GLB,,, | Performed by: NURSE PRACTITIONER

## 2022-03-30 PROCEDURE — 3074F PR MOST RECENT SYSTOLIC BLOOD PRESSURE < 130 MM HG: ICD-10-PCS | Mod: CPTII,S$GLB,, | Performed by: NURSE PRACTITIONER

## 2022-03-30 PROCEDURE — 3074F SYST BP LT 130 MM HG: CPT | Mod: CPTII,S$GLB,, | Performed by: NURSE PRACTITIONER

## 2022-03-30 PROCEDURE — 3078F PR MOST RECENT DIASTOLIC BLOOD PRESSURE < 80 MM HG: ICD-10-PCS | Mod: CPTII,S$GLB,, | Performed by: NURSE PRACTITIONER

## 2022-03-30 RX ORDER — TAMSULOSIN HYDROCHLORIDE 0.4 MG/1
0.4 CAPSULE ORAL DAILY
Qty: 30 CAPSULE | Refills: 11 | Status: SHIPPED | OUTPATIENT
Start: 2022-03-30 | End: 2022-06-14 | Stop reason: SDUPTHER

## 2022-03-30 NOTE — PROGRESS NOTES
"Subjective:      Joel Zheng is a 51 y.o. male who was self-referred for evaluation of retrograde ejaculation.      He presents today to discuss intermittent retrograde ejaculation. +DM on metformin. Denies ED, denies HTN. No history of elevated psa, bph, family history of prostate cancer.   He also reports bothersome frequency and nocturia.     The following portions of the patient's history were reviewed and updated as appropriate: allergies, current medications, past family history, past medical history, past social history, past surgical history and problem list.    Review of Systems  Constitutional: no fever or chills  ENT: no nasal congestion or sore throat  Respiratory: no cough or shortness of breath  Cardiovascular: no chest pain or palpitations  Gastrointestinal: no nausea or vomiting, tolerating diet  Genitourinary: as per HPI  Hematologic/Lymphatic: no easy bruising or lymphadenopathy  Musculoskeletal: no arthralgias or myalgias  Neurological: no seizures or tremors  Behavioral/Psych: no auditory or visual hallucinations     Objective:   Vitals: /70 (BP Location: Left arm, Patient Position: Sitting, BP Method: Small (Automatic))   Pulse 70   Ht 5' 7" (1.702 m)   Wt 73.6 kg (162 lb 4.1 oz)   BMI 25.41 kg/m²     Physical Exam   General: alert and oriented, no acute distress  Head: normocephalic, atraumatic  Neck: supple, no lymphadenopathy, normal ROM, no masses  Respiratory: Symmetric expansion, non-labored breathing  Cardiovascular: regular rate and rhythm, nomal pulses, no peripheral edema  Abdomen: soft, non tender, non distended, no palpable masses, no hernias, no hepatomegaly or splenomegaly  Genitourinary: defer   Lymphatic: no inguinal nodes  Skin: normal coloration and turgor, no rashes, no suspicious skin lesions noted  Neuro: alert and oriented x3, no gross deficits  Psych: normal judgment and insight, normal mood/affect and non-anxious    Physical Exam    Lab Review   Urinalysis " demonstrates no specimen   Lab Results   Component Value Date    WBC 7.60 09/17/2021    HGB 13.9 (L) 09/17/2021    HCT 40.1 09/17/2021    MCV 89 09/17/2021     09/17/2021     Lab Results   Component Value Date    CREATININE 0.8 09/17/2021    BUN 18 09/17/2021     Lab Results   Component Value Date    PSA 0.78 09/17/2021       Assessment and Plan:   1. Nocturia more than twice per night  -- Discussed medical treatment options for BPH such as alpha-blockers (flomax) and 5-alpha reductase inhibitors (proscar).    -- Discussed surgical options for treatment of BPH including bipolar TURP and greenlight laser, including the risks and benefits of each.    --Will trial Flomax; se discussed    2. Retrograde ejaculation  --discussed causes of RE including BPH, medication therapy, anatomy   --Reassurance provided   --Aware that flomax may worsen RE     This note is dictated on M*Modal word recognition program.  There are word recognition mistakes that are occasionally missed on review.

## 2022-04-13 ENCOUNTER — TELEPHONE (OUTPATIENT)
Dept: UROLOGY | Facility: CLINIC | Age: 52
End: 2022-04-13
Payer: COMMERCIAL

## 2022-04-13 NOTE — TELEPHONE ENCOUNTER
----- Message from Monica Murray sent at 4/13/2022  3:44 PM CDT -----  Joel Zheng calling regarding miss call from GEOVANY Leija.  call back [phone]

## 2022-04-14 NOTE — TELEPHONE ENCOUNTER
Spoke with pt. Stated that Flomax has been working for him and will make an a follow up appointment if needed.

## 2022-05-04 ENCOUNTER — OFFICE VISIT (OUTPATIENT)
Dept: PSYCHIATRY | Facility: CLINIC | Age: 52
End: 2022-05-04
Payer: COMMERCIAL

## 2022-05-04 VITALS — HEART RATE: 71 BPM | DIASTOLIC BLOOD PRESSURE: 66 MMHG | SYSTOLIC BLOOD PRESSURE: 146 MMHG

## 2022-05-04 DIAGNOSIS — G47.00 INSOMNIA, UNSPECIFIED TYPE: ICD-10-CM

## 2022-05-04 DIAGNOSIS — F33.40 MDD (RECURRENT MAJOR DEPRESSIVE DISORDER) IN REMISSION: Primary | ICD-10-CM

## 2022-05-04 DIAGNOSIS — F41.0 PANIC ATTACKS: ICD-10-CM

## 2022-05-04 DIAGNOSIS — F41.1 GAD (GENERALIZED ANXIETY DISORDER): ICD-10-CM

## 2022-05-04 PROCEDURE — 99999 PR PBB SHADOW E&M-EST. PATIENT-LVL II: ICD-10-PCS | Mod: PBBFAC,,, | Performed by: STUDENT IN AN ORGANIZED HEALTH CARE EDUCATION/TRAINING PROGRAM

## 2022-05-04 PROCEDURE — 99999 PR PBB SHADOW E&M-EST. PATIENT-LVL II: CPT | Mod: PBBFAC,,, | Performed by: STUDENT IN AN ORGANIZED HEALTH CARE EDUCATION/TRAINING PROGRAM

## 2022-05-04 PROCEDURE — 99214 OFFICE O/P EST MOD 30 MIN: CPT | Mod: S$GLB,,, | Performed by: STUDENT IN AN ORGANIZED HEALTH CARE EDUCATION/TRAINING PROGRAM

## 2022-05-04 PROCEDURE — 99214 PR OFFICE/OUTPT VISIT, EST, LEVL IV, 30-39 MIN: ICD-10-PCS | Mod: S$GLB,,, | Performed by: STUDENT IN AN ORGANIZED HEALTH CARE EDUCATION/TRAINING PROGRAM

## 2022-05-04 RX ORDER — BUSPIRONE HYDROCHLORIDE 15 MG/1
15 TABLET ORAL 2 TIMES DAILY
Qty: 60 TABLET | Refills: 3 | Status: SHIPPED | OUTPATIENT
Start: 2022-05-04 | End: 2022-09-07 | Stop reason: SDUPTHER

## 2022-05-04 RX ORDER — CLONAZEPAM 0.5 MG/1
0.5 TABLET ORAL 2 TIMES DAILY PRN
Qty: 60 TABLET | Refills: 2 | Status: SHIPPED | OUTPATIENT
Start: 2022-05-04 | End: 2023-02-14

## 2022-05-04 RX ORDER — SERTRALINE HYDROCHLORIDE 100 MG/1
100 TABLET, FILM COATED ORAL DAILY
Qty: 30 TABLET | Refills: 3 | Status: SHIPPED | OUTPATIENT
Start: 2022-05-04 | End: 2022-10-10 | Stop reason: SDUPTHER

## 2022-05-04 RX ORDER — MIRTAZAPINE 15 MG/1
15 TABLET, FILM COATED ORAL NIGHTLY
Qty: 30 TABLET | Refills: 3 | Status: SHIPPED | OUTPATIENT
Start: 2022-05-04 | End: 2022-11-30 | Stop reason: SDUPTHER

## 2022-05-04 NOTE — PROGRESS NOTES
"  OUTPATIENT PSYCHIATRY FOLLOW UP VISIT     ENCOUNTER DATE: 5/4/22  SITE:  [facility]  LENGTH OF SESSION:  23 minutes       CHIEF COMPLAINT:  Anxiety, depression     HISTORY OF PRESENTING ILLNESS:  Joel Zheng is a 51 y.o. male with history of PUJA with panic attacks, MDD recurrent, moderate without psychotic features, Insomnia who presents for follow up appointment.       Plan at last appointment on  2/2/2022:  · Continue Zoloft 200mg qd     · Continue Remeron 30mg qhs for insomnia, anxiety.    · Continue Klonppin to 0.5mg BID    · Continue Buspar 30mg BID for depression, anxiety augmentation.      · Continue with outside counselor/therapist whom he is seeing every 2 weeks in person with notable benefit.         Interval history as told by Patient - & or family/friend/spouse/caregiver with pts permission     "I've been doing a lot better."  Still utilizing klonopin BID.  Once after breakfast and once after lunch.  Taking half tablet buspar BID. Only taking 100mg zoloft once per day.  Anorgasmia resolved since cutting zoloft to 100mg and starting flomax 1 month ago.  He denies decompensation in mood or depression.  "I may have cut the mirtazapine in half too, still sleeping well."  He expresses no desire to taper his medication further.    Still seeing his therapist once a month.  Pt expressed desire to slow taper off klonopin over the next couple of months.  ED s/e have improved with flomax and self med taper.  Pt has a new partner, things are going well.       Stressors: relationship with mother, moved out of previous home of 30 years, sold his home recently, son struggling with addiction (in/out of multiple rehabs in the past.  Resources were offered).      Substance: Few cans of beer socially 1-2x/month. Never more than 3 beers in one sitting.        PSYCHIATRIC REVIEW OF SYSTEMS:(none/ yes- better/worse/stable/& what symptoms)     Symptoms of Depression: Improving.  PHQ-9: " 1  Diminished mood, anhedonia, diminished energy, insomnia, decreased appetite, diminished concentration, PMR, hopelessness, Onset 2 months ago.    Appetite improving.  Has gained some weight back.  Previously lost 20 lbs 2/2 decreased appetite during early stages of divorce during late 2021.       Symptoms of PUJA:  Improving.  PUJA-7: 4. Overall improving but anxiety still present.  On initial visit: Anxiety/worry/fear, more days than not, about numerous issues, difficult to control, with restlessness, fatigue, poor concentration, irritability, muscle tension, sleep disturbance; causes functionally impairing distress.        Symptoms of Panic Attacks:  Improving.  Recurrent panic attacks, improving in frequency.  Triggered by contact with  wife.  Description- as above     Symptoms of jose carlos or hypomania: none     Symptoms of psychosis:  none     Symptoms of PTSD:none     Sleep: initiation,  maintenance     Other Psych ROS: n/a     Symptoms of OCD: none     Symptoms of Eating Disorders: none     Symptoms of ADHD:  none     Risk Parameters:  Patient reports no suicidal ideation  Patient reports no homicidal ideation  Patient reports no self-injurious behavior  Patient reports no violent behavior     PSYCHIATRIC MED REVIEW     Current psych meds  Medication side effects:  denies  Medication compliance:   yes     Previous psych meds trials  Zoloft  buspar (makes him sleepy, Didn't work)  Paxil  remeron  Hydroxyzine  klonopin     PAST PSYCHIATRIC, MEDICAL, AND SOCIAL HISTORY REVIEWED  The patient's past medical, family and social history have been reviewed and updated as appropriate within the electronic medical record - see encounter notes.     MEDICAL REVIEW OF SYSTEMS:  Complete review of systems performed covering Constitutional, Musculoskeletal, Neurologic.  All systems negative/ except as mentioned in HPI.          ALL MEDICATIONS:     Current Outpatient Medications:     amoxicillin-clavulanate 500-125mg  (AUGMENTIN) 500-125 mg Tab, Take 1 tablet (500 mg total) by mouth 2 (two) times daily., Disp: 10 tablet, Rfl: 0    aspirin 81 MG Chew, Take 81 mg by mouth once daily., Disp: , Rfl:     atorvastatin (LIPITOR) 80 MG tablet, Take 0.5 tablets (40 mg total) by mouth once daily., Disp: 90 tablet, Rfl: 0    blood sugar diagnostic Strp, 1 each by Misc.(Non-Drug; Combo Route) route 3 (three) times daily before meals., Disp: 100 each, Rfl: 2    busPIRone (BUSPAR) 30 MG Tab, Take 1 tablet (30 mg total) by mouth 2 (two) times daily., Disp: 60 tablet, Rfl: 2    cetirizine (ZYRTEC) 10 MG tablet, Take 1 tablet (10 mg total) by mouth once daily., Disp: 60 tablet, Rfl: 0    clonazePAM (KLONOPIN) 0.5 MG tablet, Take 1 tablet (0.5 mg total) by mouth 2 (two) times daily as needed for Anxiety (panic attacks)., Disp: 60 tablet, Rfl: 0    ergocalciferol (VITAMIN D2) 50,000 unit Cap, Take 1 capsule (50,000 Units total) by mouth every 7 days., Disp: 12 capsule, Rfl: 0    fenofibrate (TRICOR) 54 MG tablet, Take 1 tablet (54 mg total) by mouth once daily., Disp: 90 tablet, Rfl: 0    hydrOXYzine HCL (ATARAX) 25 MG tablet, Take 1 tablet (25 mg total) by mouth 2 (two) times daily as needed for Anxiety., Disp: 60 tablet, Rfl: 3    metFORMIN (GLUCOPHAGE) 500 MG tablet, Take 1 tablet (500 mg total) by mouth 2 (two) times daily with meals., Disp: 180 tablet, Rfl: 0    metoprolol succinate (TOPROL-XL) 50 MG 24 hr tablet, Take 1 tablet (50 mg total) by mouth once daily., Disp: 90 tablet, Rfl: 0    mirtazapine (REMERON) 30 MG tablet, Take 1 tablet (30 mg total) by mouth every evening., Disp: 30 tablet, Rfl: 11    multivitamin (THERAGRAN) per tablet, Take 1 tablet by mouth once daily., Disp: , Rfl:     sertraline (ZOLOFT) 100 MG tablet, Take 2 tablets (200 mg total) by mouth once daily., Disp: 60 tablet, Rfl: 3    traMADoL (ULTRAM) 50 mg tablet, Take 1 tablet (50 mg total) by mouth every 12 (twelve) hours as needed for Pain., Disp: 20  "tablet, Rfl: 0    traZODone (DESYREL) 50 MG tablet, Take 1 tablet (50 mg total) by mouth nightly., Disp: 30 tablet, Rfl: 0     ALLERGIES:       Review of patient's allergies indicates:   Allergen Reactions    Bactrim [sulfamethoxazole-trimethoprim] Itching    Black pepper Itching    Grass pollen-june grass standard Itching    Pineapple fruit extract Itching         RELEVANT LABS/STUDIES:          Lab Results   Component Value Date     WBC 7.60 09/17/2021     HGB 13.9 (L) 09/17/2021     HCT 40.1 09/17/2021     MCV 89 09/17/2021      09/17/2021      BMP        Lab Results   Component Value Date      09/17/2021     K 4.0 09/17/2021      09/17/2021     CO2 25 09/17/2021     BUN 18 09/17/2021     CREATININE 0.8 09/17/2021     CALCIUM 9.8 09/17/2021     ANIONGAP 9 09/17/2021     ESTGFRAFRICA >60.0 09/17/2021     EGFRNONAA >60.0 09/17/2021            Lab Results   Component Value Date     ALT 45 (H) 09/17/2021     AST 24 09/17/2021     ALKPHOS 55 09/17/2021     BILITOT 0.5 09/17/2021            Lab Results   Component Value Date     TSH 2.727 09/17/2021            Lab Results   Component Value Date     LABA1C 6.8 (H) 09/13/2017     HGBA1C 6.8 (H) 09/17/2021         VITALS  Vitals:    05/04/22 1305   BP: (!) 146/66   Pulse: 71          PHYSICAL EXAM  PHYSICAL EXAM  General: well developed, well nourished  Neurologic:   Gait: wnl  Psychomotor signs:  wnl  AIMS: n/a     PSYCHIATRIC EXAM:     Mental Status Exam:  Appearance: unremarkable, age appropriate  Behavior/Cooperation: psychomotor retardation  Speech: normal volume, rate/tone  Language: uses words appropriately; NO aphasia or dysarthria  Mood:"good"  Affect: congruent  Thought Process: normal and logical  Thought Content: normal, no suicidality, no homicidality, delusions, or paranoia  Level of Consciousness: Alert and Oriented x3  Memory:  Intact  Attention/concentration: appropriate for age/education.   Fund of Knowledge: appears " adequate  Insight:  Intact  Judgment: Intact         IMPRESSION:    Joel Zheng is a 51 y.o. male with history of PUJA with panic attacks, MDD recurrent, moderate without psychotic features, Insomnia who presents for follow up appointment.  Stable on current med regiment.  Discussed early goals to slowly taper off klonopin.  Discussed future goals to be only using klonopin sparingly, as needed by the end of the year at the latest.    Anxiety  Stable.  Depressive symptoms in remission.  PUJA-7: 4, PHQ-9: 1 today.       Status/Progress:  Based on the examination today, the patient's problem(s) is/are well controlled.  New problems have not been presented today.     DIAGNOSES:     PUJA with panic attacks  MDD,  In remission  Insomnia       PLAN:  Psych Med:  · Decrease Zoloft to 100mg daily.  Pt has already self tapered to this dose, tolerating without decompensation.  Pt expresses no desire to further taper.  Will remain at this dose.        · Decrease Remeron to 15mg nightly for insomnia, anxiety.  Pt has already self tapered to this dose, tolerating without decompensation.  Pt expresses no desire to further taper.  Will remain at this dose.  · Continue Klonppin to 0.5mg BID PRN.  Discussed risks including addictive potential.  Discussed goal to self taper over the next few months.   Will revisit officially tapering Rx to lower dose at next appt. Instructed not to drive, operate heavy machinery or combine with alcohol.       · Decrease Buspar to 15mg BID depression, anxiety augmentation.  Pt has already self tapered to this dose, tolerating without decompensation.  Pt expresses no desire to further taper.  Will remain at this dose.  · Pt educated on risks including alcohol, addictive potential, not to operate vehicles or heavy machinery.      · Continue with outside counselor/therapist whom he is seeing monthly currently.          · Discussed with patient informed consent, risks vs. benefits, alternative  treatments, side effect profile and the inherent unpredictability of individual responses to these treatments. Answered any questions patient may have had. The patient expresses understanding of the above and displays the capacity to agree with this current plan         RETURN TO CLINIC:   3 months or sooner PRN.  Resident transition discussed.             Jose Borges MD  [facility] PGY-3

## 2023-05-17 ENCOUNTER — TELEPHONE (OUTPATIENT)
Dept: NEUROSURGERY | Facility: CLINIC | Age: 53
End: 2023-05-17
Payer: COMMERCIAL

## 2023-05-17 DIAGNOSIS — R93.0 ABNORMAL CT SCAN, SINUS: Primary | ICD-10-CM

## 2023-05-17 NOTE — TELEPHONE ENCOUNTER
----- Message from Stacey M Lefort sent at 5/17/2023 10:53 AM CDT -----  Pt is calling in regard to the referral from Dr Hussein. He can be reached at [phone]. Thank you.

## 2023-05-18 NOTE — TELEPHONE ENCOUNTER
Returned call to to. Scheduled new pt appt with Dr. Gonzalez from referral. Pt said his insurance denied MRI orderd by PCP. Placed new order in hopes scan will be approved and completed prior to appt with Dr. Gonzalez. Pt voiced understanding to appt details.

## 2023-09-18 ENCOUNTER — TELEPHONE (OUTPATIENT)
Dept: SURGERY | Facility: CLINIC | Age: 53
End: 2023-09-18
Payer: COMMERCIAL

## 2023-09-18 NOTE — TELEPHONE ENCOUNTER
----- Message from Radha Allen MA sent at 9/18/2023  9:17 AM CDT -----  Regarding: Schedule referral  Contact: [phone]  Patient is calling to schedule referral.

## 2025-04-14 ENCOUNTER — TELEPHONE (OUTPATIENT)
Dept: PHARMACY | Facility: CLINIC | Age: 55
End: 2025-04-14
Payer: COMMERCIAL

## 2025-04-14 NOTE — TELEPHONE ENCOUNTER
[facility]Population [facility] Chart Review & Patient Outreach Details For Medication Adherence Project    Reason for Outreach Encounter: 3rd Party payor non-compliance report ([employer], [employer], [employer], etc)  2.  Patient Outreach Method: Reviewed patient chart   3.   Medication in question:    Hyperlipidemia Medications              atorvastatin (LIPITOR) 80 MG tablet Take 1 tablet (80 mg total) by mouth every evening.               LF 90 ds 2/5/25    4.  Reviewed and or Updates Made To: Patient Chart  5. Outreach Outcomes and/or actions taken: Patient filled medication and is on track to be adherent  Additional Notes:

## 2025-05-08 ENCOUNTER — TELEPHONE (OUTPATIENT)
Dept: PHARMACY | Facility: CLINIC | Age: 55
End: 2025-05-08
Payer: COMMERCIAL

## 2025-05-08 NOTE — TELEPHONE ENCOUNTER
[facility] Chart Review & Patient Outreach Details For Medication Adherence Project    Reason for Outreach Encounter: 3rd Party payor non-compliance report ([employer], [employer], C, etc)  2.  Patient Outreach Method: Reviewed patient chart   3.   Medication in question:    Hyperlipidemia Medications              atorvastatin (LIPITOR) 80 MG tablet Take 1 tablet (80 mg total) by mouth every evening.                  atorvastatin  last filled  4/28/25 for 90 day supply      4.  Reviewed and or Updates Made To: Patient Chart  5. Outreach Outcomes and/or actions taken: Patient filled medication and is on track to be adherent  Additional Notes:

## 2025-06-27 ENCOUNTER — TELEPHONE (OUTPATIENT)
Dept: PHARMACY | Facility: CLINIC | Age: 55
End: 2025-06-27
Payer: COMMERCIAL
